# Patient Record
Sex: FEMALE | Race: WHITE | NOT HISPANIC OR LATINO | Employment: FULL TIME | URBAN - METROPOLITAN AREA
[De-identification: names, ages, dates, MRNs, and addresses within clinical notes are randomized per-mention and may not be internally consistent; named-entity substitution may affect disease eponyms.]

---

## 2023-06-13 LAB
ALBUMIN (MG/L) IN URINE: <7 MG/L
ALBUMIN/CREATININE (UG/MG) IN URINE: NORMAL UG/MG CRT (ref 0–30)
ANION GAP IN SER/PLAS: 11 MMOL/L (ref 10–20)
CALCIUM (MG/DL) IN SER/PLAS: 9.5 MG/DL (ref 8.6–10.6)
CARBON DIOXIDE, TOTAL (MMOL/L) IN SER/PLAS: 27 MMOL/L (ref 21–32)
CHLORIDE (MMOL/L) IN SER/PLAS: 104 MMOL/L (ref 98–107)
CREATININE (MG/DL) IN SER/PLAS: 0.82 MG/DL (ref 0.5–1.05)
CREATININE (MG/DL) IN URINE: 49.9 MG/DL (ref 20–320)
ESTIMATED AVERAGE GLUCOSE FOR HBA1C: 126 MG/DL
GFR FEMALE: >90 ML/MIN/1.73M2
GLUCOSE (MG/DL) IN SER/PLAS: 115 MG/DL (ref 74–99)
HEMOGLOBIN A1C/HEMOGLOBIN TOTAL IN BLOOD: 6 %
POTASSIUM (MMOL/L) IN SER/PLAS: 3.8 MMOL/L (ref 3.5–5.3)
SODIUM (MMOL/L) IN SER/PLAS: 138 MMOL/L (ref 136–145)
UREA NITROGEN (MG/DL) IN SER/PLAS: 10 MG/DL (ref 6–23)

## 2023-09-13 PROBLEM — E10.9 TYPE 1 DIABETES MELLITUS (MULTI): Status: ACTIVE | Noted: 2023-09-13

## 2023-09-13 RX ORDER — NORETHINDRONE AND ETHINYL ESTRADIOL 1 MG-35MCG
1 KIT ORAL DAILY
COMMUNITY
Start: 2018-10-12

## 2023-09-13 RX ORDER — SPIRONOLACTONE 50 MG/1
50 TABLET, FILM COATED ORAL
COMMUNITY
Start: 2023-01-13 | End: 2023-10-16

## 2023-09-13 RX ORDER — BLOOD SUGAR DIAGNOSTIC
STRIP MISCELLANEOUS
COMMUNITY
Start: 2015-09-10

## 2023-09-13 RX ORDER — INSULIN LISPRO-AABC 100 [IU]/ML
INJECTION, SOLUTION SUBCUTANEOUS
COMMUNITY
Start: 2021-03-22 | End: 2023-10-16 | Stop reason: SDUPTHER

## 2023-09-13 RX ORDER — ADAPALENE GEL USP, 0.3% 3 MG/G
GEL TOPICAL
COMMUNITY
Start: 2022-12-15

## 2023-09-13 RX ORDER — CLOBETASOL PROPIONATE 0.46 MG/ML
SOLUTION TOPICAL
COMMUNITY
Start: 2022-11-14

## 2023-09-13 RX ORDER — PEN NEEDLE, DIABETIC 30 GX3/16"
NEEDLE, DISPOSABLE MISCELLANEOUS
COMMUNITY
End: 2024-04-22 | Stop reason: SDUPTHER

## 2023-09-13 RX ORDER — INSULIN GLARGINE 300 U/ML
INJECTION, SOLUTION SUBCUTANEOUS
COMMUNITY
Start: 2017-12-22 | End: 2023-10-16 | Stop reason: SDUPTHER

## 2023-09-13 RX ORDER — PEN NEEDLE, DIABETIC 30 GX3/16"
NEEDLE, DISPOSABLE MISCELLANEOUS 4 TIMES DAILY
COMMUNITY
End: 2024-04-22 | Stop reason: WASHOUT

## 2023-09-13 RX ORDER — KETOCONAZOLE 20 MG/ML
SHAMPOO, SUSPENSION TOPICAL
COMMUNITY
Start: 2017-04-20

## 2023-10-12 NOTE — PROGRESS NOTES
"Patient is seen at the request of SELF for my opinion regarding type 1 diabetes. My final recommendations will be communicated back to the requesting provider by way of shared medical record.    Subjective   Dalila Benson is a 27 y.o. female with a hx of type 1 diabetes who presents to [facility].  Was seeing Dr. Garibay until he retired.    Dx: 6 yo   HbA1c:  5.9% (10/16/2023), 6.0% (06/2023), 6.0% (08/2022)  Current regimen: Toujeo 32-8 units BID, Lyumjev ICR 1: 8 TIDAC, (> 150)  Past medications: afrezza prn (insurance no longer covers)  Complications: none    SMBG: Dexcom G6    Hypoglycemia: middle of the night (3 times per week)  Hypoglycemia awareness: < 70, shakiness, sweats    Diet: 3 meals per day + 1 snack    Exercise: 5-6 times per week (running/strength/HIIT)    Shx: teacher, special education    ROS  General: no fever or chills  CV: no chest pain   Respiratory: no shortness of breath  MSK: no lower extremity edema  Neuro: no headache or dizziness  See HPI for Endocrine ROS    Objective    Physical Exam  Blood pressure 125/78, pulse 50, height 1.727 m (5' 8\"), weight 71.7 kg (158 lb).  General: not in acute distress  HEENT: ERVIN, EOMI  Thyroid: no goiter  Neuro: alert and oriented x 3      Current Outpatient Medications:     adapalene (Differin) 0.3 % gel, , Disp: , Rfl:     clobetasol (Temovate) 0.05 % external solution, , Disp: , Rfl:     glucagon (Glucagen) 1 mg injection, USE AS DIRECTED., Disp: , Rfl:     ketoconazole (NIZOral) 2 % shampoo, Ketoconazole 2 % External Shampoo  Quantity: 120  Refills: 0      Start : 20-Apr-2017  Active, Disp: , Rfl:     Lyumjev KwikPen U-100 Insulin 100 unit/mL insulin pen, Inject under the skin.  inject 35 units daily to cover meals, Disp: , Rfl:     ONETOUCH DELICA LANCETS MISC, Use 5 times daily as directed, Disp: , Rfl:     OneTouch Ultra Test strip, test 7 times daily, Disp: , Rfl:     pen needle, diabetic (BD Ultra-Fine Short Pen Needle) 31 gauge x 5/16\" needle, " "4 times a day., Disp: , Rfl:     pen needle, diabetic (Pen Needle) 32 gauge x 5/32\" needle, 6 daily, Disp: , Rfl:     Pirmella 1-35 mg-mcg tablet, Take 1 tablet by mouth once daily., Disp: , Rfl:     Toujeo SoloStar U-300 Insulin 300 unit/mL (1.5 mL) injection, Inject under the skin.  INJECT 32 units a.m. and 8 units bedtime, Disp: , Rfl:     blood-glucose sensor (DEXCOM G7 SENSOR MISC), Every 10 days, Disp: , Rfl:     regular insulin (Afrezza) 4 unit inhalation powder, Inhale 4 Units once daily., Disp: , Rfl:     spironolactone (Aldactone) 50 mg tablet, 1 tablet (50 mg)., Disp: , Rfl:     <CGM> G6  Unable to review today    Assessment/Plan   Dalila Benson is a 27 y.o. female with a hx of type 1 diabetes mellitus who presents for management of diabetes.    Type 1 diabetes mellitus  HbA1c:  5.9% (10/16/2023), 6.0% (06/2023), 6.0% (08/2022)  Current regimen: Toujeo 32-8 units BID, Lyumjev ICR 1: 8 TIDAC+, 1:50 (> 150)  Eye exam: no DR (LV 08/2023)  Urine microalbumin: neg (06/2023)  Podiatry: no symptoms of neuropathy  Lipids: HDL 63, ,  (06/2023)    A1c:  5.9% !  Has overnight hypoglycemia 3 times per week.  Could not connect to Dexcom during the visit today.  She was provided with our clinic code; she will do this at home.  Asked her to let me know once she is connected so that I can review her download and adjust her Toujeo. Will likely need to decrease bedtime dose.    PLAN:  -change Toujeo to 32-6 units BID  -continue Lyumjev 1:8, ISF 1:50 (> 150)  -check blood sugars before every meal and bedtime  -receiving Dexcom G6 ([facility])-upgrade to G7    Follow up in 6 months  [facility]  "

## 2023-10-16 ENCOUNTER — OFFICE VISIT (OUTPATIENT)
Dept: ENDOCRINOLOGY | Facility: CLINIC | Age: 28
End: 2023-10-16
Payer: COMMERCIAL

## 2023-10-16 VITALS
HEIGHT: 68 IN | WEIGHT: 158 LBS | HEART RATE: 50 BPM | SYSTOLIC BLOOD PRESSURE: 125 MMHG | DIASTOLIC BLOOD PRESSURE: 78 MMHG | BODY MASS INDEX: 23.95 KG/M2

## 2023-10-16 DIAGNOSIS — E10.9 TYPE 1 DIABETES MELLITUS WITHOUT COMPLICATION (MULTI): ICD-10-CM

## 2023-10-16 LAB — POC HEMOGLOBIN A1C: 5.9 % (ref 4.2–6.5)

## 2023-10-16 PROCEDURE — 3078F DIAST BP <80 MM HG: CPT | Performed by: STUDENT IN AN ORGANIZED HEALTH CARE EDUCATION/TRAINING PROGRAM

## 2023-10-16 PROCEDURE — 83036 HEMOGLOBIN GLYCOSYLATED A1C: CPT | Performed by: STUDENT IN AN ORGANIZED HEALTH CARE EDUCATION/TRAINING PROGRAM

## 2023-10-16 PROCEDURE — 3074F SYST BP LT 130 MM HG: CPT | Performed by: STUDENT IN AN ORGANIZED HEALTH CARE EDUCATION/TRAINING PROGRAM

## 2023-10-16 PROCEDURE — 99204 OFFICE O/P NEW MOD 45 MIN: CPT | Performed by: STUDENT IN AN ORGANIZED HEALTH CARE EDUCATION/TRAINING PROGRAM

## 2023-10-16 PROCEDURE — 1036F TOBACCO NON-USER: CPT | Performed by: STUDENT IN AN ORGANIZED HEALTH CARE EDUCATION/TRAINING PROGRAM

## 2023-10-16 PROCEDURE — 3044F HG A1C LEVEL LT 7.0%: CPT | Performed by: STUDENT IN AN ORGANIZED HEALTH CARE EDUCATION/TRAINING PROGRAM

## 2023-10-16 RX ORDER — INSULIN GLARGINE 300 U/ML
INJECTION, SOLUTION SUBCUTANEOUS
Qty: 12 ML | Refills: 1 | Status: SHIPPED | OUTPATIENT
Start: 2023-10-16 | End: 2023-11-07 | Stop reason: SDUPTHER

## 2023-10-16 RX ORDER — GLUCAGON INJECTION, SOLUTION 1 MG/.2ML
INJECTION, SOLUTION SUBCUTANEOUS
Qty: 0.4 ML | Refills: 1 | Status: SHIPPED | OUTPATIENT
Start: 2023-10-16

## 2023-10-16 RX ORDER — INSULIN LISPRO-AABC 100 [IU]/ML
INJECTION, SOLUTION SUBCUTANEOUS
Qty: 30 ML | Refills: 1 | Status: SHIPPED | OUTPATIENT
Start: 2023-10-16 | End: 2024-04-22 | Stop reason: SDUPTHER

## 2023-10-16 ASSESSMENT — PAIN SCALES - GENERAL: PAINLEVEL: 0-NO PAIN

## 2023-11-06 ENCOUNTER — TELEPHONE (OUTPATIENT)
Dept: ENDOCRINOLOGY | Facility: CLINIC | Age: 28
End: 2023-11-06
Payer: COMMERCIAL

## 2023-11-06 NOTE — TELEPHONE ENCOUNTER
Called to let you know that she'd like for you to look at her Dexcom readings. She also mentioned that [facility] states they never received the upgrade request for the G7 (I resent it after getting this call - this time via email to our contact at [facility] and asked for her help in getting it processed asap)     She also notes she needs a renewal/change in her toujeo Rx She reports that when it was sent in on 10/16 she only received 2 pens and she is almost out. She would like for you to call her in regard to all of this.

## 2023-11-07 DIAGNOSIS — E10.9 TYPE 1 DIABETES MELLITUS WITHOUT COMPLICATION (MULTI): ICD-10-CM

## 2023-11-07 RX ORDER — INSULIN GLARGINE 300 U/ML
INJECTION, SOLUTION SUBCUTANEOUS
Qty: 13.5 ML | Refills: 1 | Status: SHIPPED | OUTPATIENT
Start: 2023-11-07 | End: 2023-12-12 | Stop reason: SDUPTHER

## 2023-12-12 DIAGNOSIS — E10.9 TYPE 1 DIABETES MELLITUS WITHOUT COMPLICATION (MULTI): ICD-10-CM

## 2023-12-12 RX ORDER — INSULIN GLARGINE 300 U/ML
INJECTION, SOLUTION SUBCUTANEOUS
Qty: 13.5 ML | Refills: 1 | Status: SHIPPED | OUTPATIENT
Start: 2023-12-12 | End: 2023-12-15 | Stop reason: SDUPTHER

## 2023-12-15 DIAGNOSIS — E10.9 TYPE 1 DIABETES MELLITUS WITHOUT COMPLICATION (MULTI): ICD-10-CM

## 2023-12-15 RX ORDER — INSULIN GLARGINE 300 U/ML
INJECTION, SOLUTION SUBCUTANEOUS
Qty: 4.5 ML | Refills: 5 | Status: SHIPPED | OUTPATIENT
Start: 2023-12-15 | End: 2024-04-22 | Stop reason: SDUPTHER

## 2024-04-18 NOTE — PROGRESS NOTES
"FUV for type 1 diabetes. LV with me 10/2023.    Subjective   Dalila Benson is a 28 y.o. female with a hx of type 1 diabetes who presents for follow-up.  Was seeing Dr. Garibay until he retired.    Dx: 6 yo   HbA1c:  5.9% (10/16/2023), 6.0% (06/2023), 6.0% (08/2022)  Current regimen: Toujeo 32-4 units BID, Lyumjev ICR 1: 8 TIDAC, (> 150)  Past medications: afrezza prn (insurance no longer covers)  Complications: none    SMBG: Dexcom G7    Hypoglycemia: infrequent  Hypoglycemia awareness: < 70, shakiness, sweats    Diet: 3 meals per day + 1 snack    Exercise: 5-6 times per week (running/strength/HIIT)    Shx: teacher, special education    ROS  General: no fever or chills  CV: no chest pain   Respiratory: no shortness of breath  MSK: no lower extremity edema  Neuro: no headache or dizziness  See HPI for Endocrine ROS    Objective    Physical Exam  Blood pressure 114/77, pulse 52, height 1.753 m (5' 9\"), weight 73.5 kg (162 lb).  General: not in acute distress  HEENT: ERVIN, EOMI  Thyroid: no goiter  Neuro: alert and oriented x 3      Current Outpatient Medications:     adapalene (Differin) 0.3 % gel, , Disp: , Rfl:     blood-glucose sensor (DEXCOM G7 SENSOR MISC), Every 10 days, Disp: , Rfl:     clobetasol (Temovate) 0.05 % external solution, , Disp: , Rfl:     glucagon (Gvoke HypoPen 2-Pack) 1 mg/0.2 mL auto-injector, Inject 1 mg subcutaneously for severe hypoglycemia., Disp: 0.4 mL, Rfl: 1    ketoconazole (NIZOral) 2 % shampoo, Ketoconazole 2 % External Shampoo  Quantity: 120  Refills: 0      Start : 20-Apr-2017  Active, Disp: , Rfl:     Lyumjev KwikPen U-100 Insulin 100 unit/mL insulin pen, Use before every meal. Use carb ratio and sensitivity factor. MDD 40 units., Disp: 30 mL, Rfl: 1    ONETOUCH DELICA LANCETS MISC, Use 5 times daily as directed, Disp: , Rfl:     OneTouch Ultra Test strip, test 7 times daily, Disp: , Rfl:     pen needle, diabetic (BD Ultra-Fine Short Pen Needle) 31 gauge x 5/16\" needle, 4 times a day., " "Disp: , Rfl:     pen needle, diabetic (Pen Needle) 32 gauge x 5/32\" needle, 6 daily, Disp: , Rfl:     Pirmella 1-35 mg-mcg tablet, Take 1 tablet by mouth once daily., Disp: , Rfl:     Toujeo SoloStar U-300 Insulin 300 unit/mL (1.5 mL) injection, INJECT 32 units in the morning and 4 units bedtime. MDD 40 units., Disp: 4.5 mL, Rfl: 5            Assessment/Plan   Dalila Benson is a 28 y.o. female with a hx of type 1 diabetes mellitus who presents for management of diabetes.    Type 1 diabetes mellitus  HbA1c: 6.1% (4/22/2024),  5.9% (10/16/2023), 6.0% (06/2023), 6.0% (08/2022)  Current regimen: Toujeo 32-4 units BID, Lyumjev ICR 1: 8 TIDAC+, 1:50 (> 150)  Eye exam: no DR ( 08/2023)-scheduled for June 2024  Urine microalbumin: neg (06/2023)  Podiatry: no symptoms of neuropathy  Lipids: HDL 63, ,  (06/2023)    A1c:  6.1% today.  Dexcom download reviewed.  Much less hypoglycemia overnight since reducing night time Toujeo dose.  Consistently hyperglycemia after breakfast despite consistently taking Lyumjev before the meal.  Will increase ICR for breakfast.    Has canker sores in her mouth every now and then.  Prescribed topical steroid today but advised that she establish care with a PCP if this treatment does not help.    PLAN:  -continue Toujeo to 32-4 units BID  -change Lyumjev to 1:6 for breakfast 1:8 (lunch, dinner, snack), ISF 1:50 (> 150)  -check blood sugars before every meal and bedtime  -receiving Dexcom G7 ([facility])  -check HbA1c, urine microalbumin, lipids before next visit    Follow up in 6 months (labs prior)  Uses [facility].  "

## 2024-04-22 ENCOUNTER — OFFICE VISIT (OUTPATIENT)
Dept: ENDOCRINOLOGY | Facility: CLINIC | Age: 29
End: 2024-04-22
Payer: COMMERCIAL

## 2024-04-22 VITALS
SYSTOLIC BLOOD PRESSURE: 114 MMHG | HEART RATE: 52 BPM | BODY MASS INDEX: 23.99 KG/M2 | DIASTOLIC BLOOD PRESSURE: 77 MMHG | WEIGHT: 162 LBS | HEIGHT: 69 IN

## 2024-04-22 DIAGNOSIS — K12.0 CANKER SORES ORAL: Primary | ICD-10-CM

## 2024-04-22 DIAGNOSIS — E10.9 TYPE 1 DIABETES MELLITUS WITHOUT COMPLICATION (MULTI): ICD-10-CM

## 2024-04-22 LAB — POC HEMOGLOBIN A1C: 6.1 % (ref 4.2–6.5)

## 2024-04-22 PROCEDURE — 95251 CONT GLUC MNTR ANALYSIS I&R: CPT | Performed by: STUDENT IN AN ORGANIZED HEALTH CARE EDUCATION/TRAINING PROGRAM

## 2024-04-22 PROCEDURE — 1036F TOBACCO NON-USER: CPT | Performed by: STUDENT IN AN ORGANIZED HEALTH CARE EDUCATION/TRAINING PROGRAM

## 2024-04-22 PROCEDURE — 3074F SYST BP LT 130 MM HG: CPT | Performed by: STUDENT IN AN ORGANIZED HEALTH CARE EDUCATION/TRAINING PROGRAM

## 2024-04-22 PROCEDURE — 83036 HEMOGLOBIN GLYCOSYLATED A1C: CPT | Performed by: STUDENT IN AN ORGANIZED HEALTH CARE EDUCATION/TRAINING PROGRAM

## 2024-04-22 PROCEDURE — 3078F DIAST BP <80 MM HG: CPT | Performed by: STUDENT IN AN ORGANIZED HEALTH CARE EDUCATION/TRAINING PROGRAM

## 2024-04-22 PROCEDURE — 99215 OFFICE O/P EST HI 40 MIN: CPT | Performed by: STUDENT IN AN ORGANIZED HEALTH CARE EDUCATION/TRAINING PROGRAM

## 2024-04-22 RX ORDER — INSULIN LISPRO-AABC 100 [IU]/ML
INJECTION, SOLUTION SUBCUTANEOUS
Qty: 45 ML | Refills: 1 | Status: SHIPPED | OUTPATIENT
Start: 2024-04-22

## 2024-04-22 RX ORDER — INSULIN GLARGINE 300 U/ML
INJECTION, SOLUTION SUBCUTANEOUS
Qty: 6 ML | Refills: 1 | Status: SHIPPED | OUTPATIENT
Start: 2024-04-22

## 2024-04-22 RX ORDER — PEN NEEDLE, DIABETIC 30 GX3/16"
NEEDLE, DISPOSABLE MISCELLANEOUS
Qty: 800 EACH | Refills: 3 | Status: SHIPPED | OUTPATIENT
Start: 2024-04-22 | End: 2024-05-13 | Stop reason: SDUPTHER

## 2024-04-22 RX ORDER — CLOBETASOL PROPIONATE 0.5 MG/G
OINTMENT TOPICAL
Qty: 30 G | Refills: 0 | Status: SHIPPED | OUTPATIENT
Start: 2024-04-22

## 2024-05-12 ENCOUNTER — PATIENT MESSAGE (OUTPATIENT)
Dept: ENDOCRINOLOGY | Facility: CLINIC | Age: 29
End: 2024-05-12
Payer: COMMERCIAL

## 2024-05-13 DIAGNOSIS — E10.9 TYPE 1 DIABETES MELLITUS WITHOUT COMPLICATION (MULTI): ICD-10-CM

## 2024-05-13 RX ORDER — PEN NEEDLE, DIABETIC 30 GX3/16"
NEEDLE, DISPOSABLE MISCELLANEOUS
Qty: 800 EACH | Refills: 3 | Status: SHIPPED | OUTPATIENT
Start: 2024-05-13

## 2024-10-11 ENCOUNTER — LAB (OUTPATIENT)
Dept: LAB | Facility: LAB | Age: 29
End: 2024-10-11
Payer: COMMERCIAL

## 2024-10-11 DIAGNOSIS — E10.9 TYPE 1 DIABETES MELLITUS WITHOUT COMPLICATION: ICD-10-CM

## 2024-10-11 LAB
ALBUMIN SERPL BCP-MCNC: 4 G/DL (ref 3.4–5)
ALP SERPL-CCNC: 36 U/L (ref 33–110)
ALT SERPL W P-5'-P-CCNC: 9 U/L (ref 7–45)
ANION GAP SERPL CALC-SCNC: 12 MMOL/L (ref 10–20)
AST SERPL W P-5'-P-CCNC: 14 U/L (ref 9–39)
BILIRUB SERPL-MCNC: 0.4 MG/DL (ref 0–1.2)
BUN SERPL-MCNC: 10 MG/DL (ref 6–23)
CALCIUM SERPL-MCNC: 9.1 MG/DL (ref 8.6–10.6)
CHLORIDE SERPL-SCNC: 106 MMOL/L (ref 98–107)
CHOLEST SERPL-MCNC: 195 MG/DL (ref 0–199)
CHOLESTEROL/HDL RATIO: 4.4
CO2 SERPL-SCNC: 24 MMOL/L (ref 21–32)
CREAT SERPL-MCNC: 0.74 MG/DL (ref 0.5–1.05)
CREAT UR-MCNC: 74.2 MG/DL (ref 20–320)
EGFRCR SERPLBLD CKD-EPI 2021: >90 ML/MIN/1.73M*2
EST. AVERAGE GLUCOSE BLD GHB EST-MCNC: 117 MG/DL
GLUCOSE SERPL-MCNC: 70 MG/DL (ref 74–99)
HBA1C MFR BLD: 5.7 %
HDLC SERPL-MCNC: 44 MG/DL
LDLC SERPL CALC-MCNC: 131 MG/DL
MICROALBUMIN UR-MCNC: <7 MG/L
MICROALBUMIN/CREAT UR: NORMAL MG/G{CREAT}
NON HDL CHOLESTEROL: 151 MG/DL (ref 0–149)
POTASSIUM SERPL-SCNC: 4 MMOL/L (ref 3.5–5.3)
PROT SERPL-MCNC: 6.4 G/DL (ref 6.4–8.2)
SODIUM SERPL-SCNC: 138 MMOL/L (ref 136–145)
TRIGL SERPL-MCNC: 100 MG/DL (ref 0–149)
VLDL: 20 MG/DL (ref 0–40)

## 2024-10-11 PROCEDURE — 80053 COMPREHEN METABOLIC PANEL: CPT

## 2024-10-11 PROCEDURE — 82043 UR ALBUMIN QUANTITATIVE: CPT

## 2024-10-11 PROCEDURE — 82570 ASSAY OF URINE CREATININE: CPT

## 2024-10-11 PROCEDURE — 36415 COLL VENOUS BLD VENIPUNCTURE: CPT

## 2024-10-11 PROCEDURE — 80061 LIPID PANEL: CPT

## 2024-10-11 PROCEDURE — 83036 HEMOGLOBIN GLYCOSYLATED A1C: CPT

## 2024-10-28 ENCOUNTER — APPOINTMENT (OUTPATIENT)
Dept: ENDOCRINOLOGY | Facility: CLINIC | Age: 29
End: 2024-10-28
Payer: COMMERCIAL

## 2024-10-28 VITALS
HEART RATE: 62 BPM | DIASTOLIC BLOOD PRESSURE: 70 MMHG | WEIGHT: 156 LBS | HEIGHT: 69 IN | SYSTOLIC BLOOD PRESSURE: 119 MMHG | BODY MASS INDEX: 23.11 KG/M2

## 2024-10-28 DIAGNOSIS — E10.9 TYPE 1 DIABETES MELLITUS WITHOUT COMPLICATION: ICD-10-CM

## 2024-10-28 PROCEDURE — 3008F BODY MASS INDEX DOCD: CPT | Performed by: STUDENT IN AN ORGANIZED HEALTH CARE EDUCATION/TRAINING PROGRAM

## 2024-10-28 PROCEDURE — 99214 OFFICE O/P EST MOD 30 MIN: CPT | Performed by: STUDENT IN AN ORGANIZED HEALTH CARE EDUCATION/TRAINING PROGRAM

## 2024-10-28 PROCEDURE — 95251 CONT GLUC MNTR ANALYSIS I&R: CPT | Performed by: STUDENT IN AN ORGANIZED HEALTH CARE EDUCATION/TRAINING PROGRAM

## 2024-10-28 PROCEDURE — 3050F LDL-C >= 130 MG/DL: CPT | Performed by: STUDENT IN AN ORGANIZED HEALTH CARE EDUCATION/TRAINING PROGRAM

## 2024-10-28 PROCEDURE — 3078F DIAST BP <80 MM HG: CPT | Performed by: STUDENT IN AN ORGANIZED HEALTH CARE EDUCATION/TRAINING PROGRAM

## 2024-10-28 PROCEDURE — 3044F HG A1C LEVEL LT 7.0%: CPT | Performed by: STUDENT IN AN ORGANIZED HEALTH CARE EDUCATION/TRAINING PROGRAM

## 2024-10-28 PROCEDURE — 3062F POS MACROALBUMINURIA REV: CPT | Performed by: STUDENT IN AN ORGANIZED HEALTH CARE EDUCATION/TRAINING PROGRAM

## 2024-10-28 PROCEDURE — 1036F TOBACCO NON-USER: CPT | Performed by: STUDENT IN AN ORGANIZED HEALTH CARE EDUCATION/TRAINING PROGRAM

## 2024-10-28 PROCEDURE — 3074F SYST BP LT 130 MM HG: CPT | Performed by: STUDENT IN AN ORGANIZED HEALTH CARE EDUCATION/TRAINING PROGRAM

## 2024-10-28 RX ORDER — INSULIN LISPRO-AABC 100 [IU]/ML
INJECTION, SOLUTION SUBCUTANEOUS
Qty: 45 ML | Refills: 3 | Status: SHIPPED | OUTPATIENT
Start: 2024-10-28

## 2024-10-28 RX ORDER — INSULIN GLARGINE 300 U/ML
INJECTION, SOLUTION SUBCUTANEOUS
Qty: 6 ML | Refills: 3 | Status: SHIPPED | OUTPATIENT
Start: 2024-10-28

## 2024-12-23 ENCOUNTER — TELEPHONE (OUTPATIENT)
Dept: ENDOCRINOLOGY | Facility: CLINIC | Age: 29
End: 2024-12-23
Payer: COMMERCIAL

## 2024-12-23 NOTE — TELEPHONE ENCOUNTER
Last office visit:  10/28/24  Next office visit:  04/28/25      Received notes from eye exam by Dr. Ranjit Barrera @ [facility]  completed on 09/20/24.    Available for your review under   Chart review: Media: 12/23/24: Patient record: Eye Exam

## 2025-04-22 NOTE — PROGRESS NOTES
"FUV for type 1 diabetes. LV with me 10/2024.    Subjective   Dalila Benson is a 29 y.o. female with a hx of type 1 diabetes who presents for follow-up.  Was seeing Dr. Garibay until he retired.    Dx: 6 yo   HbA1c:  6.0% (04/2025), 5.7% (10/2024), 6.1% (4/22/2024),  5.9% (10/16/2023), 6.0% (06/2023), 6.0% (08/2022)  Current regimen: Toujeo 32-4 units BID, Lyumjev ICR 1:10 for breakfast 1:8 (lunch, dinner, snack), ISF 1:50 (> 150)  Past medications: afrezza prn (insurance no longer covers)  Complications: none    SMBG: Dexcom G7    Hypoglycemia: infrequent  Hypoglycemia awareness: < 70, shakiness, sweats    Diet: 3 meals per day + 1 snack    Exercise: 5-6 times per week (running/strength/HIIT)    Shx: teacher, special education    ROS  General: no fever or chills  CV: no chest pain   Respiratory: no shortness of breath  MSK: no lower extremity edema  Neuro: no headache or dizziness  See HPI for Endocrine ROS    Objective    Physical Exam  Blood pressure 132/77, pulse 61, height 1.753 m (5' 9\"), weight 69.4 kg (153 lb).  General: not in acute distress  HEENT: ERVIN, EOMI  Thyroid: no goiter  Neuro: alert and oriented x 3      Current Outpatient Medications:     adapalene (Differin) 0.3 % gel, , Disp: , Rfl:     blood-glucose sensor (DEXCOM G7 SENSOR MISC), Every 10 days, Disp: , Rfl:     glucagon (Gvoke HypoPen 2-Pack) 1 mg/0.2 mL auto-injector, Inject 1 mg subcutaneously for severe hypoglycemia., Disp: 0.4 mL, Rfl: 1    Lyumjev KwikPen U-100 Insulin 100 unit/mL insulin pen, Use before every meal. Use carb ratio and sensitivity factor. MDD 40 units., Disp: 45 mL, Rfl: 3    ONETOUCH DELICA LANCETS MISC, Use 5 times daily as directed, Disp: , Rfl:     pen needle, diabetic (Pen Needle) 32 gauge x 5/32\" needle, Use with insulin injections 6-8 times per day, Disp: 800 each, Rfl: 3    Pirmella 1-35 mg-mcg tablet, Take 1 tablet by mouth once daily., Disp: , Rfl:     Toujeo SoloStar U-300 Insulin 300 unit/mL (1.5 mL) injection, " INJECT 32 units in the morning and 4 units bedtime. MDD 40 units., Disp: 6 mL, Rfl: 3    OneTouch Ultra Test, To check blood sugars once daily, Disp: 50 strip, Rfl: 5                Assessment/Plan   Dalila Benson is a 29 y.o. female with a hx of type 1 diabetes mellitus who presents for management of diabetes.    Type 1 diabetes mellitus  HbA1c: 6.0% (04/2025), 5.7% (10/2024), 6.1% (4/22/2024),  5.9% (10/16/2023), 6.0% (06/2023), 6.0% (08/2022)  Current regimen: Toujeo 32-4 units BID, Lyumjev ICR 1:10 for breakfast 1:8 (lunch, dinner, snack), ISF 1:50 (> 150)  Eye exam: no DR (LV 09/2024)  Urine microalbumin: neg (10/2024)  Podiatry: foot exam done 10/28/2024  Lipids: HDL 47, , non-,  (04/2025)    A1c: 6.0%.  Dexcom download reviewed.  Occasional hypoglycemia overnight.  Will reduce Toujeo.  She also has post-prandial hyperglycemia. She is taking Lyumjev before meals, but using an ICR of 1:10 for breakfast. She was previously on 1:8 and was still having hyperglycemia, so recommended 1:6. She will try the 1:6.  She takes correctional Lyumjev when BG>190 after meals which can be as early as 30 minutes after the meal. The correction is causing hypoglycemia. Advised that she wait at least 2 hours between Lyumjev doses.    LDL improved from 131 to 114. Non-HDL and TG are elevated.  Currently, she does not have any risk factors other than diabetes, so she does not have a clear indication for statins but continued monitoring is needed. Discussed that she will likely need statins in the future.    PLAN:  -change Toujeo to 30-4 units BID  -change  Lyumjev ICR to 1:6 for breakfast 1:8 (lunch, dinner, snack), ISF 1:50 (> 150)  -check blood sugars before every meal and bedtime  -receiving Dexcom G7 and pen needles through ([facility])  -check HbA1c, lipids, CMP, urine microalbumin before next visit    Follow up in 6 months with Dr. Horne ([facility])-labs prior  Uses [facility].

## 2025-04-25 LAB
CHOLEST SERPL-MCNC: 191 MG/DL
CHOLEST/HDLC SERPL: 4.1 (CALC)
EST. AVERAGE GLUCOSE BLD GHB EST-MCNC: 126 MG/DL
EST. AVERAGE GLUCOSE BLD GHB EST-SCNC: 7 MMOL/L
HBA1C MFR BLD: 6 %
HDLC SERPL-MCNC: 47 MG/DL
LDLC SERPL CALC-MCNC: 114 MG/DL (CALC)
NONHDLC SERPL-MCNC: 144 MG/DL (CALC)
TRIGL SERPL-MCNC: 188 MG/DL

## 2025-04-28 ENCOUNTER — APPOINTMENT (OUTPATIENT)
Dept: ENDOCRINOLOGY | Facility: CLINIC | Age: 30
End: 2025-04-28
Payer: COMMERCIAL

## 2025-04-28 VITALS
HEART RATE: 61 BPM | WEIGHT: 153 LBS | HEIGHT: 69 IN | DIASTOLIC BLOOD PRESSURE: 77 MMHG | SYSTOLIC BLOOD PRESSURE: 132 MMHG | BODY MASS INDEX: 22.66 KG/M2

## 2025-04-28 DIAGNOSIS — E10.9 TYPE 1 DIABETES MELLITUS WITHOUT COMPLICATION: Primary | ICD-10-CM

## 2025-04-28 PROCEDURE — 95251 CONT GLUC MNTR ANALYSIS I&R: CPT | Performed by: STUDENT IN AN ORGANIZED HEALTH CARE EDUCATION/TRAINING PROGRAM

## 2025-04-28 PROCEDURE — 3075F SYST BP GE 130 - 139MM HG: CPT | Performed by: STUDENT IN AN ORGANIZED HEALTH CARE EDUCATION/TRAINING PROGRAM

## 2025-04-28 PROCEDURE — 3008F BODY MASS INDEX DOCD: CPT | Performed by: STUDENT IN AN ORGANIZED HEALTH CARE EDUCATION/TRAINING PROGRAM

## 2025-04-28 PROCEDURE — 3078F DIAST BP <80 MM HG: CPT | Performed by: STUDENT IN AN ORGANIZED HEALTH CARE EDUCATION/TRAINING PROGRAM

## 2025-04-28 PROCEDURE — 99215 OFFICE O/P EST HI 40 MIN: CPT | Performed by: STUDENT IN AN ORGANIZED HEALTH CARE EDUCATION/TRAINING PROGRAM

## 2025-04-28 RX ORDER — BLOOD SUGAR DIAGNOSTIC
STRIP MISCELLANEOUS
Qty: 50 STRIP | Refills: 5 | Status: SHIPPED | OUTPATIENT
Start: 2025-04-28

## 2025-04-30 DIAGNOSIS — E10.9 TYPE 1 DIABETES MELLITUS WITHOUT COMPLICATION: ICD-10-CM

## 2025-04-30 RX ORDER — INSULIN GLARGINE 300 U/ML
INJECTION, SOLUTION SUBCUTANEOUS
Qty: 12 ML | Refills: 3 | Status: SHIPPED | OUTPATIENT
Start: 2025-04-30

## 2025-05-09 DIAGNOSIS — E10.9 TYPE 1 DIABETES MELLITUS WITHOUT COMPLICATION: ICD-10-CM

## 2025-05-09 RX ORDER — INSULIN LISPRO-AABC 100 [IU]/ML
INJECTION, SOLUTION SUBCUTANEOUS
Qty: 45 ML | Refills: 1 | Status: SHIPPED | OUTPATIENT
Start: 2025-05-09

## 2025-08-20 LAB — NON-UH HIE CALPROTECTIN, FECAL: 12

## 2025-08-27 ENCOUNTER — HOSPITAL ENCOUNTER (OUTPATIENT)
Dept: RADIOLOGY | Facility: CLINIC | Age: 30
Discharge: HOME | End: 2025-08-27
Payer: COMMERCIAL

## 2025-08-27 ENCOUNTER — OFFICE VISIT (OUTPATIENT)
Dept: URGENT CARE | Age: 30
End: 2025-08-27
Payer: COMMERCIAL

## 2025-08-27 VITALS
TEMPERATURE: 97 F | SYSTOLIC BLOOD PRESSURE: 135 MMHG | DIASTOLIC BLOOD PRESSURE: 81 MMHG | OXYGEN SATURATION: 99 % | HEART RATE: 62 BPM | RESPIRATION RATE: 20 BRPM

## 2025-08-27 DIAGNOSIS — S69.91XA INJURY OF RIGHT LITTLE FINGER, INITIAL ENCOUNTER: ICD-10-CM

## 2025-08-27 DIAGNOSIS — S69.91XA INJURY OF RIGHT LITTLE FINGER, INITIAL ENCOUNTER: Primary | ICD-10-CM

## 2025-08-27 DIAGNOSIS — S62.626A CLOSED DISPLACED FRACTURE OF MIDDLE PHALANX OF RIGHT LITTLE FINGER, INITIAL ENCOUNTER: ICD-10-CM

## 2025-08-27 PROCEDURE — 3079F DIAST BP 80-89 MM HG: CPT | Performed by: NURSE PRACTITIONER

## 2025-08-27 PROCEDURE — 73140 X-RAY EXAM OF FINGER(S): CPT | Mod: RIGHT SIDE | Performed by: RADIOLOGY

## 2025-08-27 PROCEDURE — 3075F SYST BP GE 130 - 139MM HG: CPT | Performed by: NURSE PRACTITIONER

## 2025-08-27 PROCEDURE — 99213 OFFICE O/P EST LOW 20 MIN: CPT | Performed by: NURSE PRACTITIONER

## 2025-08-27 PROCEDURE — 73140 X-RAY EXAM OF FINGER(S): CPT | Mod: RT

## 2025-08-27 PROCEDURE — 1036F TOBACCO NON-USER: CPT | Performed by: NURSE PRACTITIONER

## 2025-08-27 ASSESSMENT — ENCOUNTER SYMPTOMS: ARTHRALGIAS: 1

## 2025-08-28 ENCOUNTER — CLINICAL SUPPORT (OUTPATIENT)
Dept: ORTHOPEDIC SURGERY | Facility: CLINIC | Age: 30
End: 2025-08-28
Payer: COMMERCIAL

## 2025-08-28 ENCOUNTER — APPOINTMENT (OUTPATIENT)
Dept: ORTHOPEDIC SURGERY | Facility: CLINIC | Age: 30
End: 2025-08-28
Payer: COMMERCIAL

## 2025-09-03 ENCOUNTER — APPOINTMENT (OUTPATIENT)
Dept: ORTHOPEDIC SURGERY | Facility: CLINIC | Age: 30
End: 2025-09-03
Payer: COMMERCIAL

## 2025-10-28 ENCOUNTER — APPOINTMENT (OUTPATIENT)
Age: 30
End: 2025-10-28
Payer: COMMERCIAL